# Patient Record
Sex: FEMALE | NOT HISPANIC OR LATINO | URBAN - METROPOLITAN AREA
[De-identification: names, ages, dates, MRNs, and addresses within clinical notes are randomized per-mention and may not be internally consistent; named-entity substitution may affect disease eponyms.]

---

## 2020-09-15 ENCOUNTER — OFFICE VISIT CONVERTED (OUTPATIENT)
Dept: UROLOGY | Facility: CLINIC | Age: 50
End: 2020-09-15
Attending: UROLOGY

## 2020-09-15 ENCOUNTER — HOSPITAL ENCOUNTER (OUTPATIENT)
Dept: SURGERY | Facility: CLINIC | Age: 50
Discharge: HOME | End: 2020-09-15
Attending: UROLOGY

## 2020-09-17 LAB — BACTERIA UR CULT: NORMAL

## 2020-10-02 ENCOUNTER — HOSPITAL ENCOUNTER (OUTPATIENT)
Dept: PREADMISSION TESTING | Facility: HOSPITAL | Age: 50
Discharge: HOME | End: 2020-10-02
Attending: UROLOGY

## 2020-10-05 LAB — SARS-COV-2 RNA SPEC QL NAA+PROBE: NOT DETECTED

## 2020-10-07 ENCOUNTER — HOSPITAL ENCOUNTER (OUTPATIENT)
Dept: PERIOP | Facility: HOSPITAL | Age: 50
Setting detail: HOSPITAL OUTPATIENT SURGERY
Discharge: HOME | End: 2020-10-07
Attending: UROLOGY

## 2020-10-07 LAB
ANION GAP SERPL CALC-SCNC: 20 MMOL/L (ref 8–19)
BUN SERPL-MCNC: 17 MG/DL (ref 5–25)
BUN/CREAT SERPL: 23 {RATIO} (ref 6–20)
CALCIUM SERPL-MCNC: 10 MG/DL (ref 8.7–10.4)
CHLORIDE SERPL-SCNC: 95 MMOL/L (ref 99–111)
CONV CO2: 22 MMOL/L (ref 22–32)
CREAT UR-MCNC: 0.74 MG/DL (ref 0.5–0.9)
GFR SERPLBLD BASED ON 1.73 SQ M-ARVRAT: >60 ML/MIN/{1.73_M2}
GLUCOSE BLD-MCNC: 270 MG/DL (ref 65–99)
GLUCOSE SERPL-MCNC: 269 MG/DL (ref 65–99)
OSMOLALITY SERPL CALC.SUM OF ELEC: 287 MOSM/KG (ref 273–304)
POTASSIUM SERPL-SCNC: 4.2 MMOL/L (ref 3.5–5.3)
SODIUM SERPL-SCNC: 133 MMOL/L (ref 135–147)

## 2020-10-21 LAB
COLOR STONE: NORMAL
COMPN STONE: NORMAL
CONV CA OXALATE DIHYDRATE: 10 %
CONV CA OXALATE MONOHYDRATE: 90 %
CONV CALCULI COMMENT: NORMAL
CONV CALCULI DISCLAIMER: NORMAL
CONV CALCULI NOTE: NORMAL
CONV PHOTO (CALCULI): NORMAL
SIZE STONE: NORMAL MM
WT STONE: 22 MG

## 2020-10-22 ENCOUNTER — HOSPITAL ENCOUNTER (OUTPATIENT)
Dept: PERIOP | Facility: HOSPITAL | Age: 50
Setting detail: HOSPITAL OUTPATIENT SURGERY
Discharge: HOME | End: 2020-10-22
Attending: UROLOGY

## 2020-10-22 LAB
GLUCOSE BLD-MCNC: 163 MG/DL (ref 65–99)
GLUCOSE BLD-MCNC: 169 MG/DL (ref 65–99)

## 2020-11-06 LAB
COLOR STONE: NORMAL
COMPN STONE: NORMAL
CONV CA OXALATE MONOHYDRATE: 100 %
CONV CALCULI COMMENT: NORMAL
CONV CALCULI COMMENT: NORMAL
CONV CALCULI DISCLAIMER: NORMAL
CONV CALCULI NOTE: NORMAL
CONV PHOTO (CALCULI): NORMAL
SIZE STONE: NORMAL MM
WT STONE: 105 MG

## 2020-11-23 ENCOUNTER — HOSPITAL ENCOUNTER (OUTPATIENT)
Dept: ULTRASOUND IMAGING | Facility: HOSPITAL | Age: 50
Discharge: HOME | End: 2020-11-23
Attending: UROLOGY

## 2020-12-21 ENCOUNTER — HOSPITAL ENCOUNTER (OUTPATIENT)
Dept: ULTRASOUND IMAGING | Facility: HOSPITAL | Age: 50
Discharge: HOME | End: 2020-12-21
Attending: UROLOGY

## 2020-12-21 LAB
ANION GAP SERPL CALC-SCNC: 16 MMOL/L (ref 8–19)
BUN SERPL-MCNC: 14 MG/DL (ref 5–25)
BUN/CREAT SERPL: 18 {RATIO} (ref 6–20)
CALCIUM SERPL-MCNC: 10.1 MG/DL (ref 8.7–10.4)
CHLORIDE SERPL-SCNC: 99 MMOL/L (ref 99–111)
CONV CO2: 24 MMOL/L (ref 22–32)
CREAT UR-MCNC: 0.77 MG/DL (ref 0.5–0.9)
GFR SERPLBLD BASED ON 1.73 SQ M-ARVRAT: >60 ML/MIN/{1.73_M2}
GLUCOSE SERPL-MCNC: 240 MG/DL (ref 65–99)
OSMOLALITY SERPL CALC.SUM OF ELEC: 288 MOSM/KG (ref 273–304)
POTASSIUM SERPL-SCNC: 4 MMOL/L (ref 3.5–5.3)
SODIUM SERPL-SCNC: 135 MMOL/L (ref 135–147)

## 2021-01-14 ENCOUNTER — HOSPITAL ENCOUNTER (OUTPATIENT)
Dept: CT IMAGING | Facility: HOSPITAL | Age: 51
Discharge: HOME | End: 2021-01-14
Attending: UROLOGY

## 2021-01-19 ENCOUNTER — TELEPHONE CONVERTED (OUTPATIENT)
Dept: UROLOGY | Facility: CLINIC | Age: 51
End: 2021-01-19
Attending: UROLOGY

## 2021-01-22 ENCOUNTER — HOSPITAL ENCOUNTER (OUTPATIENT)
Dept: OTHER | Facility: HOSPITAL | Age: 51
Discharge: HOME | End: 2021-01-22
Attending: UROLOGY

## 2021-01-24 LAB — BACTERIA UR CULT: NORMAL

## 2021-02-06 ENCOUNTER — HOSPITAL ENCOUNTER (OUTPATIENT)
Dept: PREADMISSION TESTING | Facility: HOSPITAL | Age: 51
Discharge: HOME | End: 2021-02-06
Attending: UROLOGY

## 2021-02-06 LAB — SARS-COV-2 RNA SPEC QL NAA+PROBE: NOT DETECTED

## 2021-02-17 ENCOUNTER — HOSPITAL ENCOUNTER (OUTPATIENT)
Dept: PERIOP | Facility: HOSPITAL | Age: 51
Setting detail: HOSPITAL OUTPATIENT SURGERY
Discharge: HOME | End: 2021-02-17
Attending: UROLOGY

## 2021-02-17 LAB
ANION GAP SERPL CALC-SCNC: 15 MMOL/L (ref 8–19)
BUN SERPL-MCNC: 10 MG/DL (ref 5–25)
BUN/CREAT SERPL: 14 {RATIO} (ref 6–20)
CALCIUM SERPL-MCNC: 9.6 MG/DL (ref 8.7–10.4)
CHLORIDE SERPL-SCNC: 99 MMOL/L (ref 99–111)
CONV CO2: 24 MMOL/L (ref 22–32)
CREAT UR-MCNC: 0.73 MG/DL (ref 0.5–0.9)
GFR SERPLBLD BASED ON 1.73 SQ M-ARVRAT: >60 ML/MIN/{1.73_M2}
GLUCOSE BLD-MCNC: 180 MG/DL (ref 65–99)
GLUCOSE BLD-MCNC: 264 MG/DL (ref 65–99)
GLUCOSE SERPL-MCNC: 284 MG/DL (ref 65–99)
OSMOLALITY SERPL CALC.SUM OF ELEC: 287 MOSM/KG (ref 273–304)
POTASSIUM SERPL-SCNC: 4 MMOL/L (ref 3.5–5.3)
SODIUM SERPL-SCNC: 134 MMOL/L (ref 135–147)

## 2021-03-02 ENCOUNTER — PROCEDURE VISIT CONVERTED (OUTPATIENT)
Dept: UROLOGY | Facility: CLINIC | Age: 51
End: 2021-03-02
Attending: UROLOGY

## 2021-04-26 ENCOUNTER — TELEPHONE CONVERTED (OUTPATIENT)
Dept: UROLOGY | Facility: CLINIC | Age: 51
End: 2021-04-26
Attending: UROLOGY

## 2021-05-06 ENCOUNTER — HOSPITAL ENCOUNTER (OUTPATIENT)
Dept: OTHER | Facility: HOSPITAL | Age: 51
Discharge: HOME | End: 2021-05-06
Attending: INTERNAL MEDICINE

## 2021-05-10 LAB
ASPERGILLUS AB TITR SER CF: NORMAL {TITER}
C IMMITIS AB TITR SER CF: NORMAL {TITER}
CONV BLASTOMYCES AB (SERUM) BY EIA: 0.3 IV
CONV HISTOPLASMA MYCELIA (SERUM) BY CF: NORMAL
CONV HISTOPLASMA YEAST (SERUM) BY CF: NORMAL

## 2021-05-10 NOTE — H&P
History and Physical      Patient Name: Polina Louise   Patient ID: [id number]   Sex: Female   YOB: 1970    Primary Care Provider: Sabrina Miranda MD   Referring Provider: Sabrina Miranda MD    Visit Date: January 19, 2021    Provider: Moises Juarez MD   Location: [facility]   Location Address: [address] [place] [address]  [id number]   Location Phone: [phone]          Chief Complaint  · Outpatient History & Physical / Surgical Orders      History Of Present Illness  [facility]  Outpatient History and Physical Surgical Orders  Preadmission Location: Phone Preadmission Time: 09:00 AM   Which Facility: [facility] Surgery Date: 02/17/2021 Preadmission Testing Date: 02/05/2021   Patient's Name: Polina Louise YOB: 1970   Chief complaint/history present illness: nephrolithiasis   Current Medication List: Basaglar KwikPen U-100 Insulin 100 unit/mL (3 mL) subcutaneous insulin pen, bupropion HCl oral, ezetimibe 10 mg oral tablet, fenofibrate 160 mg oral tablet, glipizide 10 mg oral tablet, metformin 1,000 mg oral tablet, metoprolol succinate 25 mg oral tablet extended release 24 hr, olmesartan 40 mg oral tablet, pantoprazole 40 mg oral tablet,delayed release (DR/EC), rosuvastatin 20 mg oral tablet, Vitamin B-12 1,000 mcg/mL oral drops, and Vitamin D3 25 mcg (1,000 unit) oral capsule   Allergies: NO KNOWN DRUG ALLERGIES   Significant past medical: Diabetes, High blood pressure, and High cholesterol   Past Surgical History: Hysterectomy-Abdominal   Examination of heart and lungs: Regular rate, rhythm, no murmur, gallop, rub, Breath sounds normal, no distress, and Abdomen soft, non-tender, BSx4 are positive         Past Medical History  Diabetes; High blood pressure; High cholesterol         Past Surgical History  Hysterectomy-Abdominal         Medication List  Basaglar KwikPen U-100 Insulin 100 unit/mL (3 mL) subcutaneous insulin  pen; bupropion HCl oral; ezetimibe 10 mg oral tablet; fenofibrate 160 mg oral tablet; glipizide 10 mg oral tablet; metformin 1,000 mg oral tablet; metoprolol succinate 25 mg oral tablet extended release 24 hr; olmesartan 40 mg oral tablet; pantoprazole 40 mg oral tablet,delayed release (DR/EC); rosuvastatin 20 mg oral tablet; Vitamin B-12 1,000 mcg/mL oral drops; Vitamin D3 25 mcg (1,000 unit) oral capsule         Allergy List  NO KNOWN DRUG ALLERGIES       Allergies Reconciled  Family Medical History  Diabetes, unspecified type; Prostate cancer; Family history of breast cancer         Social History  Alcohol (Never); Tobacco (Never)             Assessment  · Pre-Surgical Orders     V72.84  · Preop testing     V72.84/Z01.818      Plan  · Orders  o General Urology Surgery Order ([facility]) - V72.84 - 02/11/2021  o [facility] Pre-Op Covid-19 Screening (27467) - V72.84/Z01.818 - 02/06/2021   [address] behind [facility]  · Medications  o Medications have been Reconciled  o Transition of Care or Provider Policy  · Instructions  o *****Surgical Orders******  o Pre-Operative Orders: Sign permit for cystoscopy, right ureteroscopy, right retrograde pyelogram, possible laser, and right ureteral stent.  o Outpatient   o Levaquin 500 mg IV OCTOR.  o RISK AND BENEFITS:  o Possible risks/complications, benefits and alternatives to surgical or invasive procedure have been explained to patient and/or legal guardian.  o Electronically Identified Patient Education Materials Provided Electronically            Electronically Signed by: Moises Juarez MD -Author on February 11, 2021 08:07:23 AM

## 2021-05-10 NOTE — H&P
"   History and Physical      Patient Name: Polina Louise   Patient ID: [id number]   Sex: Female   YOB: 1970    Primary Care Provider: Sabrina Miranda MD   Referring Provider: Sabrina Miranda MD    Visit Date: September 15, 2020    Provider: Moises Juarez MD   Location: [facility]   Location Address: [address] [place] [address]  [id number]   Location Phone: [phone]          Chief Complaint  · pt here for urologic issues      History Of Present Illness     50-year-old female here today with a nonobstructing right kidney stone.    Patient had a CT secondary symptoms abdominal pain.    9/3/2020 CT abdomen/[place] nonobstructing stone lower pole right kidney.  Ureteral stones no stones on the left.    No flank pain.    No history of kidney stone    9/20 creatinine 0.4    9/3/2020 urine cultureE. coli, patient was given IV Rocephin, was not sent home on oral medication.  No h/o UTIs as an adult.  No urologic family history,   Has never had any urologic surgery.    No cardiopulmonary history.  Patient does not smoke.  Patient does not use blood thinner.  DM          Past Medical History  Diabetes; High blood pressure; High cholesterol         Past Surgical History  Hysterectomy-Abdominal         Allergy List  NO KNOWN DRUG ALLERGIES       Allergies Reconciled  Family Medical History  Diabetes, unspecified type; Prostate cancer; Family history of breast cancer         Social History  Tobacco         Review of Systems  · Constitutional  o Denies  o : chills, fever  · Gastrointestinal  o Admits  o : flank pain  o Denies  o : nausea, vomiting      Vitals  Date Time BP Position Site L\R Cuff Size HR RR TEMP (F) WT  HT  BMI kg/m2 BSA m2 O2 Sat HC       09/15/2020 12:40 PM       17  226lbs 0oz 5'  6\" 36.48 2.18           Physical Examination  · Constitutional  o Appearance  o : Well-appearing, well-developed, in no acute distress  · Respiratory  o Respiratory " Effort  o : Unlabored breathing  · Cardiovascular  o Heart  o :   § Auscultation of Heart  § : Regular rate and rhythm, no murmurs  · Neurologic  o Mental Status Examination  o :   § Orientation  § : Grossly oriented to person, place and time, judgment and insight intact, normal mood and affect              Assessment  · Nephrolithiasis     592.0/N20.0    Problems Reconciled  Plan  · Orders  o Urine Culture (Cath) [facility] (87744) - 592.0/N20.0 - 09/15/2020  · Medications  o Medications have been Reconciled  o Transition of Care or Provider Policy  · Instructions  o Electronically Identified Patient Education Materials Provided Electronically       Patient with a 16 mm nonobstructing stone.  I did recommend surgical removal because of the size of the stone.  We will plan on cystoscopy with right ureteroscopy with laser and right ureteral stent placement.  Risks and benefits were discussed including bleeding, infection and damage to the urinary system.  We also discussed the risk of anesthesia up to and including death.  Patient voiced understanding and would like to proceed.     Catheterized urine culture today    CT images and read reviewed with the patient today    [place] records reviewed today and summarized in the chart    Greater than 20 minutes was used in counseling and coordination of care, with greater than 51% of this in face-to-face counseling             Electronically Signed by: Moises Juarez MD -Author on September 15, 2020 01:21:18 PM

## 2021-05-10 NOTE — H&P
"   History and Physical      Patient Name: Polina Louise   Patient ID: [id number]   Sex: Female   YOB: 1970    Primary Care Provider: Sbarina Miranda MD   Referring Provider: Sabrina Miranda MD    Visit Date: September 15, 2020    Provider: Moises Juarez MD   Location: [facility]   Location Address: [address] [place] [address]  [id number]   Location Phone: [phone]          Chief Complaint  · Outpatient History & Physical / Surgical Orders      History Of Present Illness  [facility]  Outpatient History and Physical Surgical Orders  Preadmission Location: Phone Preadmission Time: 10:00 AM   Which Facility: [facility] Surgery Date: 10/07/2020 Preadmission Testing Date: 10/01/2020   Patient's Name: Polina Louise YOB: 1970   Chief complaint/history present illness: nephrolithiasis   Current Medication List: Basaglar KwikPen U-100 Insulin 100 unit/mL (3 mL) subcutaneous insulin pen, bupropion HCl oral, ezetimibe 10 mg oral tablet, fenofibrate 160 mg oral tablet, glipizide 10 mg oral tablet, metformin 1,000 mg oral tablet, metoprolol succinate 25 mg oral tablet extended release 24 hr, olmesartan 40 mg oral tablet, pantoprazole 40 mg oral tablet,delayed release (DR/EC), rosuvastatin 20 mg oral tablet, Vitamin B-12 1,000 mcg/mL oral drops, and Vitamin D3 25 mcg (1,000 unit) oral capsule   Allergies: NO KNOWN DRUG ALLERGIES   Significant past medical: Diabetes, High blood pressure, and High cholesterol   Past Surgical History: Hysterectomy-Abdominal   Examination of heart and lungs: Regular rate, rhythm, no murmur, gallop, rub, Breath sounds normal, no distress, and Abdomen soft, non-tender, BSx4 are positive         Allergy List    Allergies Reconciled  Vitals  Date Time BP Position Site L\R Cuff Size HR RR TEMP (F) WT  HT  BMI kg/m2 BSA m2 O2 Sat HC       09/15/2020 12:40 PM       17  226lbs 0oz 5'  6\" 36.48 2.18   "             Assessment  · Pre-Surgical Orders     V72.84    Problems Reconciled  Plan  · Orders  o General Urology Surgery Order ([facility]) - V72.84 - 10/07/2020  o [facility] Covid-19 Screening (49485) - V72.84 - 10/02/2020   scheduled at 11:15  · Medications  o Medications have been Reconciled  o Transition of Care or Provider Policy  · Instructions  o *****Surgical Orders******  o Pre-Operative Orders: Sign permit for cystoscopy with right ureteroscopy with laser and right ureteral stent   o Outpatient   o Levaquin 500 mg IV OCTOR.  o RISK AND BENEFITS:  o Possible risks/complications, benefits and alternatives to surgical or invasive procedure have been explained to patient and/or legal guardian.            Electronically Signed by: Moises Juarez MD -Author on September 15, 2020 01:35:52 PM

## 2021-05-10 NOTE — PROCEDURES
Procedure Note      Patient Name: Polina Louise   Patient ID: [id number]   Sex: Female   YOB: 1970    Primary Care Provider: Sabrina Miranda MD   Referring Provider: Sabrina Miranda MD    Visit Date: March 2, 2021    Provider: Moises Juarez MD   Location: [facility]   Location Address: [address] [place] [address]  [id number]   Location Phone: [phone]          Cystoscopy with Stent Removal  Pre-Procedure Diagnosis: nephrolithasis   Post-Procedural Diagnosis: Same   Procedure Performed: Cystoscopy with Ureteral Stent Removal   Description of the Procedure:  Polina Louise was appropriately identified and brought to the cystoscopy suite. A timeout was undertaken documenting the correct patient, site, and procedure. The patient was prepped in a normal sterile fashion. A flexible cystoscope was then introduced into the bladder. The stent was identified and grasped with endoscopic graspers. The entire stent was removed and passed off the field. Patient tolerated the procedure well. There were no intraprocedural complications.           Assessment  · Nephrolithiasis     592.0/N20.0      Plan  · Orders  o Cystoscopy with Stent Removal (00092) - 592.0/N20.0 - 03/02/2021  · Medications  o Medications have been Reconciled  o Transition of Care or Provider Policy     f/u 1 month with renal US             Electronically Signed by: Moises Juarez MD -Author on March 2, 2021 04:34:25 PM

## 2021-05-14 VITALS — BODY MASS INDEX: 36.32 KG/M2 | RESPIRATION RATE: 17 BRPM | WEIGHT: 226 LBS | HEIGHT: 66 IN

## 2021-05-14 NOTE — PROGRESS NOTES
"   Progress Note      Patient Name: Polina Louise   Patient ID: [id number]   Sex: Female   YOB: 1970    Primary Care Provider: Sabrina Miranda MD   Referring Provider: Sabrina Miranda MD    Visit Date: January 19, 2021    Provider: Moises Juarez MD   Location: [facility]   Location Address: [address] [place] [address]  [id number]   Location Phone: [phone]          Chief Complaint  · urological issues      History Of Present Illness  TELEHEALTH TELEPHONE VISIT  Polina Louise is a 50 year old /White female who is presenting for evaluation via telehealth telephone visit. Verbal consent obtained before beginning visit.   Provider spent 12 minutes with the patient during the telehealth visit.   The following staff were present during this visit: A SHeshaguftan   Past Medical History/ Overview of Patient Symptoms     61    10/22/2020 right ureteroscopy with laser and stentstones removed in the right collecting system.    1st stone    Previous    9/3/2020 CT abdomen/pelvis with - [place] - 16 mm nonobstructing stone lower pole right kidney.  Ureteral stones no stones on the left.    9/20 creatinine 0.4    9/3/2020 urine cultureE. coli, patient was given IV Rocephin, was not sent home on oral medication.  No h/o UTIs as an adult.  No urologic family history,   Has never had any urologic surgery.    No cardiopulmonary history.  Patient does not smoke.  Patient does not use blood thinner.  DM   \">50-year-old female here today with a nonobstructing right kidney stone.    Patient had a CT secondary symptoms abdominal pain.    No flank pain.  Is having some new urgency/frequency since surgery.  No burning or dysuria.    1/14/2021 CT abdomen/pelvis with and withoutmoderate right hydronephrosis with transition point the right UPJ.  3 mm stone right.  UPJ presumed causing obstruction.  Some delayed images show some irregular filling defects in the right renal pelvis.  " Enlargement of ovaries which needs to be followed.  pul nodule needs to be followed    Is being followed by specialist for pulmonary nodules    12/21/2020 creatinine 0.7,  GFR > 60    10/22/2020 right ureteroscopy with laser and stentstones removed in the right collecting system.    1st stone    Previous    9/3/2020 CT abdomen/pelvis with - [place] - 16 mm nonobstructing stone lower pole right kidney.  Ureteral stones no stones on the left.    9/20 creatinine 0.4    9/3/2020 urine cultureE. coli, patient was given IV Rocephin, was not sent home on oral medication.  No h/o UTIs as an adult.  No urologic family history,   Has never had any urologic surgery.    No cardiopulmonary history.  Patient does not smoke.  Patient does not use blood thinner.  DM          Past Medical History  Diabetes; High blood pressure; High cholesterol         Past Surgical History  Hysterectomy-Abdominal         Medication List  Basaglar KwikPen U-100 Insulin 100 unit/mL (3 mL) subcutaneous insulin pen; bupropion HCl oral; ezetimibe 10 mg oral tablet; fenofibrate 160 mg oral tablet; glipizide 10 mg oral tablet; metformin 1,000 mg oral tablet; metoprolol succinate 25 mg oral tablet extended release 24 hr; olmesartan 40 mg oral tablet; pantoprazole 40 mg oral tablet,delayed release (DR/EC); rosuvastatin 20 mg oral tablet; Vitamin B-12 1,000 mcg/mL oral drops; Vitamin D3 25 mcg (1,000 unit) oral capsule         Allergy List  NO KNOWN DRUG ALLERGIES         Family Medical History  Diabetes, unspecified type; Prostate cancer; Family history of breast cancer         Social History  Alcohol (Never); Tobacco (Never)         Review of Systems  · Constitutional  o Denies  o : fatigue, night sweats  · Eyes  o Denies  o : double vision, blurred vision  · HENT  o Denies  o : vertigo, recent head injury  · Breasts  o Denies  o : abnormal changes in breast size, additional breast symptoms except as noted in the HPI  · Cardiovascular  o Denies  o :  chest pain, irregular heart beats  · Respiratory  o Denies  o : shortness of breath, productive cough  · Gastrointestinal  o Denies  o : nausea, vomiting  · Genitourinary  o Admits  o : frequency  o Denies  o : dysuria, urinary retention  · Integument  o Denies  o : hair growth change, new skin lesions  · Neurologic  o Denies  o : altered mental status, seizures  · Musculoskeletal  o Denies  o : joint swelling, limitation of motion  · Endocrine  o Denies  o : cold intolerance, heat intolerance  · Heme-Lymph  o Denies  o : petechiae, lymph node enlargement or tenderness  · Allergic-Immunologic  o Denies  o : frequent illnesses          Assessment  · Nephrolithiasis     592.0/N20.0  · Hydronephrosis     591/N13.30      Plan  · Orders  o Physician Telephone Evaluation, 11-20 minutes (81717) - 592.0/N20.0, 591/N13.30 - 01/19/2021  o Urine Culture (Clean Catch) [facility] (75806) - 592.0/N20.0, 591/N13.30 - 02/09/2021  · Medications  o Medications have been Reconciled  o Transition of Care or Provider Policy  · Instructions  o Plan Of Care:   o Electronically Identified Patient Education Materials Provided Electronically       Nephrolithiasis/hydronephrosis    Patient with hydronephrosis and a stone obstructing on the right side after recent ureteroscopy.  After discussion I will get her set up for cystoscopy with right ureteroscopy with possible laser and stent.  Right retrograde pyelogram.  Risks and benefits were discussed including bleeding, infection and damage to the urinary system.  We also discussed the risk of anesthesia up to and including death.  Patient voiced understanding and would like to proceed.    Patient also counseled to have her gynecologist review the CT over her mildly enlarged ovaries.  Patient voiced understanding and will get this done               Electronically Signed by: Moises Juarez MD -Author on January 19, 2021 09:06:33 AM

## 2021-05-14 NOTE — PROGRESS NOTES
"   Progress Note      Patient Name: Polina Louise   Patient ID: [id number]   Sex: Female   YOB: 1970    Primary Care Provider: Sabrina Miranda MD   Referring Provider: Sabrina Miranda MD    Visit Date: April 26, 2021    Provider: Moises Juarez MD   Location: [facility]   Location Address: [address] [place] [address]  [id number]   Location Phone: [phone]          Chief Complaint  · urologic issues      History Of Present Illness  TELEHEALTH TELEPHONE VISIT  Polina Louise is a 50 year old /White female who is presenting for evaluation via telehealth telephone visit. Verbal consent obtained before beginning visit.   Provider spent 11 minutes with the patient during the telehealth visit.   The following staff were present during this visit: A Bartolomen   Past Medical History/ Overview of Patient Symptoms     61    10/22/2020 right ureteroscopy with laser and stentstones removed in the right collecting system.    1st stone    9/3/2020 CT abdomen/pelvis with - [place] - 16 mm nonobstructing stone lower pole right kidney.  Ureteral stones no stones on the left.    9/20 creatinine 0.4    9/3/2020 urine cultureE. coli, patient was given IV Rocephin, was not sent home on oral medication.  No h/o UTIs as an adult.  No urologic family history,   Has never had any urologic surgery.  \">50-year-old female status post ureteroscopy back in 10/20 that had a repeat CT scan showing some moderate right hydronephrosis with a transition point at the UPJ.  Follows up after recent ureteroscopy    2/17/2021 right ureteroscopy, right retrograde right ureteral stent placement.-Normal bladder, right retrograde showed a tight area about 3 cm below the UPJ.  Got down to 1 to 2 mm.  Mild hydronephrosis in the right renal pelvis.  No hydronephrosis of the ureter.  Ureteroscope would go through with mild pressure.  No stones.  7 x 26 stent placed.    No flank pain.  No burning or dysuria. " No GH    Stent is out.    No cardiopulmonary history.  Patient does not smoke.  Patient does not use blood thinner.  DM     Previous    1/14/2021 CT abdomen/pelvis with and withoutmoderate right hydronephrosis with transition point the right UPJ.  3 mm stone right.  UPJ presumed causing obstruction.  Some delayed images show some irregular filling defects in the right renal pelvis.  Enlargement of ovaries which needs to be followed.  pul nodule needs to be followed    Is being followed by specialist for pulmonary nodules    12/21/2020 creatinine 0.7,  GFR > 60    10/22/2020 right ureteroscopy with laser and stentstones removed in the right collecting system.    1st stone    9/3/2020 CT abdomen/pelvis with - [place] - 16 mm nonobstructing stone lower pole right kidney.  Ureteral stones no stones on the left.    9/20 creatinine 0.4    9/3/2020 urine cultureE. coli, patient was given IV Rocephin, was not sent home on oral medication.  No h/o UTIs as an adult.  No urologic family history,   Has never had any urologic surgery.         Past Medical History  Diabetes; High blood pressure; High cholesterol         Past Surgical History  Hysterectomy-Abdominal         Medication List  Bactrim -160 mg oral tablet; Basaglar KwikPen U-100 Insulin 100 unit/mL (3 mL) subcutaneous insulin pen; bupropion HCl oral; ezetimibe 10 mg oral tablet; fenofibrate 160 mg oral tablet; glipizide 10 mg oral tablet; metformin 1,000 mg oral tablet; metoprolol succinate 25 mg oral tablet extended release 24 hr; olmesartan 40 mg oral tablet; pantoprazole 40 mg oral tablet,delayed release (DR/EC); rosuvastatin 20 mg oral tablet; Vitamin B-12 1,000 mcg/mL oral drops; Vitamin D3 25 mcg (1,000 unit) oral capsule         Allergy List  NO KNOWN DRUG ALLERGIES       Allergies Reconciled  Family Medical History  Diabetes, unspecified type; Prostate cancer; Family history of breast cancer         Social History  Alcohol (Never); Tobacco (Never)          Review of Systems  · Constitutional  o Denies  o : chills, fever  · Gastrointestinal  o Denies  o : nausea, vomiting          Assessment  · Nephrolithiasis     592.0/N20.0  · Hydronephrosis     591/N13.30      Plan  · Orders  o Physician Telephone Evaluation, 11-20 minutes (71005) - 592.0/N20.0, 591/N13.30 - 04/26/2021  · Medications  o Medications have been Reconciled  o Transition of Care or Provider Policy  · Instructions  o Electronically Identified Patient Education Materials Provided Electronically       The patient was counseled on the preventative measures of kidney stones today.  This included increasing fluid intake to make at least 1.5 ml daily, decreasing meat intake, decreasing salt intake and taking in a normal amount of calcium (1000 mg daily).      Renal ultrasound reviewed and is normal.  Patient given reassurance that hopefully no scar tissue is formed in her ureter after this ureteroscopy.  I will check another renal ultrasound in 6 months to make sure there is no signs of hydronephrosis and if it is not I will release her from our clinic at that time.                 Electronically Signed by: Moises Juarez MD -Author on April 26, 2021 09:58:32 AM

## 2021-06-09 LAB — FUNGUS CSF CULT: NORMAL
